# Patient Record
Sex: MALE | Race: WHITE | NOT HISPANIC OR LATINO | Employment: FULL TIME | URBAN - METROPOLITAN AREA
[De-identification: names, ages, dates, MRNs, and addresses within clinical notes are randomized per-mention and may not be internally consistent; named-entity substitution may affect disease eponyms.]

---

## 2018-07-16 ENCOUNTER — HOSPITAL ENCOUNTER (OUTPATIENT)
Dept: FAMILY MEDICINE CLINIC | Facility: CLINIC | Age: 33
Discharge: HOME | End: 2018-07-16
Attending: NURSE PRACTITIONER | Admitting: NURSE PRACTITIONER

## 2018-07-17 ENCOUNTER — CONVERSION ENCOUNTER (OUTPATIENT)
Dept: FAMILY MEDICINE CLINIC | Facility: CLINIC | Age: 33
End: 2018-07-17

## 2018-07-17 LAB
ALBUMIN SERPL-MCNC: 4.2 G/DL (ref 3.6–5.1)
ALBUMIN/GLOB SERPL: NORMAL {RATIO} (ref 1–2.1)
ALP SERPL-CCNC: 64 UNITS/L (ref 40–115)
ALT SERPL-CCNC: 21 UNITS/L (ref 8–46)
AST SERPL-CCNC: 20 UNITS/L (ref 10–40)
BASOPHILS NFR BLD AUTO: 1 %
BILIRUB SERPL-MCNC: 0.2 MG/DL (ref 0.2–1.2)
BUN SERPL-MCNC: 18 MG/DL (ref 7–25)
BUN/CREAT SERPL: NORMAL (ref 6–22)
CALCIUM SERPL-MCNC: 9.5 MG/DL (ref 8.6–10.2)
CHLORIDE SERPL-SCNC: 107 MMOL/L (ref 98–110)
CHOLEST SERPL-MCNC: 168 MG/DL (ref 125–200)
CHOLEST/HDLC SERPL: NORMAL {RATIO}
CONV CO2: 23 MMOL/L (ref 21–33)
CONV NEUTROPHILS/100 LEUKOCYTES IN BODY FLUID BY MANUAL COUNT: 63 %
CONV TOTAL PROTEIN: 6.6 G/DL (ref 6.2–8.3)
CREAT UR-MCNC: 0.9 MG/DL (ref 0.79–1.33)
EOSINOPHIL # BLD AUTO: 3 %
EOSINOPHIL # BLD AUTO: NORMAL 10*3/MM3 (ref 15–500)
ERYTHROCYTE [DISTWIDTH] IN BLOOD BY AUTOMATED COUNT: 13.9 % (ref 11–15)
GLOBULIN UR ELPH-MCNC: NORMAL G/DL (ref 2.1–3.7)
GLUCOSE SERPL-MCNC: 95 MG/DL (ref 65–99)
HCT VFR BLD AUTO: 48 % (ref 38.5–50)
HDLC SERPL-MCNC: 46 MG/DL
HGB BLD-MCNC: 15.5 G/DL (ref 13.2–17.1)
LDLC SERPL CALC-MCNC: 94 MG/DL
LYMPHOCYTES # BLD AUTO: NORMAL 10*3/MM3 (ref 850–3900)
LYMPHOCYTES NFR BLD AUTO: 27 %
MCH RBC QN AUTO: 29.3 PG (ref 27–33)
MCHC RBC AUTO-ENTMCNC: NORMAL % (ref 32–36)
MCV RBC AUTO: 90.6 FL (ref 80–100)
MONOCYTES # BLD AUTO: NORMAL 10*3/MICROLITER (ref 200–950)
MONOCYTES NFR BLD AUTO: 8 %
NEUTROPHILS # BLD AUTO: NORMAL 10*3/MM3 (ref 1500–7800)
PLATELET # BLD AUTO: NORMAL 10*3/MM3 (ref 140–400)
PMV BLD AUTO: 10.3 FL (ref 7.5–11.5)
POTASSIUM SERPL-SCNC: 4.1 MMOL/L (ref 3.5–5.3)
RBC # BLD AUTO: NORMAL 10*6/MM3 (ref 4.2–5.8)
SODIUM SERPL-SCNC: 138 MMOL/L (ref 135–146)
TRIGL SERPL-MCNC: 140 MG/DL
WBC # BLD AUTO: NORMAL 10*3/MM3 (ref 3.8–10.8)

## 2019-02-19 ENCOUNTER — HOSPITAL ENCOUNTER (OUTPATIENT)
Dept: FAMILY MEDICINE CLINIC | Facility: CLINIC | Age: 34
Discharge: HOME | End: 2019-02-19
Attending: NURSE PRACTITIONER | Admitting: NURSE PRACTITIONER

## 2019-04-15 ENCOUNTER — HOSPITAL ENCOUNTER (OUTPATIENT)
Dept: MRI IMAGING | Facility: HOSPITAL | Age: 34
Discharge: HOME | End: 2019-04-15
Attending: ORTHOPAEDIC SURGERY | Admitting: ORTHOPAEDIC SURGERY

## 2019-05-16 ENCOUNTER — HOSPITAL ENCOUNTER (OUTPATIENT)
Dept: PREADMISSION TESTING | Facility: HOSPITAL | Age: 34
Discharge: HOME | End: 2019-05-16
Attending: ORTHOPAEDIC SURGERY | Admitting: ORTHOPAEDIC SURGERY

## 2019-05-16 LAB
ANION GAP SERPL CALC-SCNC: 11.8 MMOL/L (ref 10–20)
BASOPHILS # BLD AUTO: 0 10*3/UL (ref 0–0.2)
BASOPHILS NFR BLD AUTO: 0 % (ref 0–2)
BUN SERPL-MCNC: 13 MG/DL (ref 8–20)
BUN/CREAT SERPL: 14.4 (ref 6.2–20.3)
CALCIUM SERPL-MCNC: 9.1 MG/DL (ref 8.9–10.3)
CHLORIDE SERPL-SCNC: 105 MMOL/L (ref 101–111)
CONV CO2: 25 MMOL/L (ref 22–32)
CREAT UR-MCNC: 0.9 MG/DL (ref 0.7–1.2)
DIFFERENTIAL METHOD BLD: (no result)
EOSINOPHIL # BLD AUTO: 0.2 10*3/UL (ref 0–0.3)
EOSINOPHIL # BLD AUTO: 3 % (ref 0–3)
ERYTHROCYTE [DISTWIDTH] IN BLOOD BY AUTOMATED COUNT: 14 % (ref 11.5–14.5)
GLUCOSE SERPL-MCNC: 70 MG/DL (ref 65–99)
HCT VFR BLD AUTO: 48.8 % (ref 40–54)
HGB BLD-MCNC: 15.8 G/DL (ref 14–18)
LYMPHOCYTES # BLD AUTO: 1.4 10*3/UL (ref 0.8–4.8)
LYMPHOCYTES NFR BLD AUTO: 26 % (ref 18–42)
MCH RBC QN AUTO: 30 PG (ref 26–32)
MCHC RBC AUTO-ENTMCNC: 32.3 G/DL (ref 32–36)
MCV RBC AUTO: 92.9 FL (ref 80–94)
MONOCYTES # BLD AUTO: 0.5 10*3/UL (ref 0.1–1.3)
MONOCYTES NFR BLD AUTO: 8 % (ref 2–11)
NEUTROPHILS # BLD AUTO: 3.4 10*3/UL (ref 2.3–8.6)
NEUTROPHILS NFR BLD AUTO: 63 % (ref 50–75)
NRBC BLD AUTO-RTO: 0 /100{WBCS}
NRBC/RBC NFR BLD MANUAL: 0 10*3/UL
PLATELET # BLD AUTO: 162 10*3/UL (ref 150–450)
PMV BLD AUTO: 11.1 FL (ref 7.4–10.4)
POTASSIUM SERPL-SCNC: 3.8 MMOL/L (ref 3.6–5.1)
RBC # BLD AUTO: 5.26 10*6/UL (ref 4.6–6)
SODIUM SERPL-SCNC: 138 MMOL/L (ref 136–144)
WBC # BLD AUTO: 5.4 10*3/UL (ref 4.5–11.5)

## 2019-11-20 ENCOUNTER — OFFICE VISIT (OUTPATIENT)
Dept: ORTHOPEDIC SURGERY | Facility: CLINIC | Age: 34
End: 2019-11-20

## 2019-11-20 VITALS
HEIGHT: 72 IN | WEIGHT: 181 LBS | SYSTOLIC BLOOD PRESSURE: 113 MMHG | DIASTOLIC BLOOD PRESSURE: 82 MMHG | HEART RATE: 93 BPM | BODY MASS INDEX: 24.52 KG/M2

## 2019-11-20 DIAGNOSIS — M25.512 ACUTE PAIN OF LEFT SHOULDER: Primary | ICD-10-CM

## 2019-11-20 PROBLEM — F17.200 TOBACCO DEPENDENCE SYNDROME: Status: ACTIVE | Noted: 2018-07-16

## 2019-11-20 PROBLEM — F43.10 POSTTRAUMATIC STRESS DISORDER: Status: ACTIVE | Noted: 2018-07-16

## 2019-11-20 PROBLEM — J45.909 ASTHMA: Status: ACTIVE | Noted: 2018-07-16

## 2019-11-20 PROBLEM — F41.8 ANXIETY ASSOCIATED WITH DEPRESSION: Status: ACTIVE | Noted: 2018-07-16

## 2019-11-20 PROBLEM — G47.00 INSOMNIA: Status: ACTIVE | Noted: 2018-07-16

## 2019-11-20 PROBLEM — M25.562 LEFT LATERAL KNEE PAIN: Status: ACTIVE | Noted: 2017-10-26

## 2019-11-20 PROBLEM — Z13.220 ENCOUNTER FOR LIPID SCREENING FOR CARDIOVASCULAR DISEASE: Status: ACTIVE | Noted: 2018-07-16

## 2019-11-20 PROBLEM — Z13.6 ENCOUNTER FOR LIPID SCREENING FOR CARDIOVASCULAR DISEASE: Status: ACTIVE | Noted: 2018-07-16

## 2019-11-20 PROBLEM — M54.50 LOW BACK PAIN: Status: ACTIVE | Noted: 2019-02-19

## 2019-11-20 PROCEDURE — 99213 OFFICE O/P EST LOW 20 MIN: CPT | Performed by: ORTHOPAEDIC SURGERY

## 2019-11-20 RX ORDER — ESCITALOPRAM OXALATE 10 MG/1
10 TABLET ORAL EVERY MORNING
COMMUNITY
Start: 2019-01-21 | End: 2021-03-29 | Stop reason: SDUPTHER

## 2019-11-20 RX ORDER — TRAZODONE HYDROCHLORIDE 50 MG/1
TABLET ORAL
COMMUNITY
Start: 2019-01-21 | End: 2021-03-29

## 2019-11-20 NOTE — PROGRESS NOTES
"     Patient ID: Obi Pereyra is a 34 y.o. male.  Left shoulder pain  Obi is a 34-year-old gentleman with return of left shoulder pain.  He did well with a cortisone injection last year but now has significant pain over the top and front of the shoulder worse with lifting and at night.  He is having some increasing popping as well pain is sharp and a 6/10    Review of Systems:  Left shoulder pain  Denies chest pain      Objective:    /82   Pulse 93   Ht 182.9 cm (72\")   Wt 82.1 kg (181 lb)   BMI 24.55 kg/m²     Physical Examination:     Well-developed, well-nourished, in no acute distress; alert and oriented x 3.       Shoulder Exam:     Left shoulder demonstrates no scars and no atrophy.  He has pain over the biceps groove and AC joint.  Passive elevation 170°, abduction 130°, external rotation 40°, internal rotation is the left hip.  He has pain and weakness on Speed and [place] testing.    Supraspinatus is mildly positive for pain.  Belly press and lift-off are 4/5 and 4/5.with pain on crossarm adduction.  sensory and motor exam are intact all distributions.  Radial pulse is palpable and capillary refill is less than two seconds to all digits    Imaging:   X-ray demonstrates well-maintained joint space of the distal clavicle ostial lysis    Assessment:    Left shoulder pain possible cuff tear    Plan:  Options discussed and I recommend MRI to evaluate for possible cuff tear          Disclaimer: Please note that areas of this note were completed with computer voice recognition software.  Quite often unanticipated grammatical, syntax, homophones, and other interpretive errors are inadvertently transcribed by the computer software. Please excuse any errors that have escaped final proofreading.  "

## 2019-12-01 ENCOUNTER — HOSPITAL ENCOUNTER (OUTPATIENT)
Dept: MRI IMAGING | Facility: HOSPITAL | Age: 34
Discharge: HOME | End: 2019-12-01
Admitting: ORTHOPAEDIC SURGERY

## 2019-12-01 DIAGNOSIS — M25.512 ACUTE PAIN OF LEFT SHOULDER: ICD-10-CM

## 2019-12-01 PROCEDURE — 73221 MRI JOINT UPR EXTREM W/O DYE: CPT

## 2019-12-11 ENCOUNTER — OFFICE VISIT (OUTPATIENT)
Dept: ORTHOPEDIC SURGERY | Facility: CLINIC | Age: 34
End: 2019-12-11

## 2019-12-11 VITALS
SYSTOLIC BLOOD PRESSURE: 143 MMHG | HEART RATE: 92 BPM | DIASTOLIC BLOOD PRESSURE: 82 MMHG | WEIGHT: 181 LBS | HEIGHT: 72 IN | BODY MASS INDEX: 24.52 KG/M2

## 2019-12-11 DIAGNOSIS — S43.432D SUPERIOR GLENOID LABRUM LESION OF LEFT SHOULDER, SUBSEQUENT ENCOUNTER: ICD-10-CM

## 2019-12-11 DIAGNOSIS — M25.512 ACUTE PAIN OF LEFT SHOULDER: Primary | ICD-10-CM

## 2019-12-11 PROCEDURE — 99213 OFFICE O/P EST LOW 20 MIN: CPT | Performed by: ORTHOPAEDIC SURGERY

## 2019-12-11 NOTE — PROGRESS NOTES
"     Patient ID: Obi Pereyra is a 34 y.o. male.  Left shoulder pain  Continued activity related shoulder pain    Review of Systems:  Left shoulder pain  Denies chest pain      Objective:    /82   Pulse 92   Ht 182.9 cm (72\")   Wt 82.1 kg (181 lb)   BMI 24.55 kg/m²     Physical Examination:     Well-developed, well-nourished, in no acute distress; alert and oriented x 3.       Shoulder Exam:     Left shoulder demonstrates no scars and no atrophy.  He has pain over the biceps groove and AC joint.  Passive elevation 170°, abduction 130°, external rotation 40°, internal rotation is the left hip.  He has pain and weakness on Speed and [place] testing.    Supraspinatus is mildly positive for pain.  Belly press and lift-off are 4/5 and 4/5.with pain on crossarm adduction.  sensory and motor exam are intact all distributions.  Radial pulse is palpable and capillary refill is less than two seconds to all digits    Imaging:   MRI demonstrates SLAP tear    Assessment:    Superior labrum tear left shoulder    Plan:  Options discussed, recommend therapy, see me in 6 weeks          Disclaimer: Please note that areas of this note were completed with computer voice recognition software.  Quite often unanticipated grammatical, syntax, homophones, and other interpretive errors are inadvertently transcribed by the computer software. Please excuse any errors that have escaped final proofreading.  "

## 2020-01-22 ENCOUNTER — OFFICE VISIT (OUTPATIENT)
Dept: ORTHOPEDIC SURGERY | Facility: CLINIC | Age: 35
End: 2020-01-22

## 2020-01-22 VITALS
SYSTOLIC BLOOD PRESSURE: 166 MMHG | BODY MASS INDEX: 24.52 KG/M2 | HEIGHT: 72 IN | DIASTOLIC BLOOD PRESSURE: 80 MMHG | WEIGHT: 181 LBS | HEART RATE: 91 BPM

## 2020-01-22 DIAGNOSIS — S43.432D SUPERIOR GLENOID LABRUM LESION OF LEFT SHOULDER, SUBSEQUENT ENCOUNTER: Primary | ICD-10-CM

## 2020-01-22 PROCEDURE — 99213 OFFICE O/P EST LOW 20 MIN: CPT | Performed by: ORTHOPAEDIC SURGERY

## 2020-01-22 NOTE — PROGRESS NOTES
Patient ID: Obi Pereyra is a 34 y.o. male.  Left shoulder pain  SLAP tear, treated with PT the last 6 weeks.  Feels like he is about 50% better, not at goal    Review of Systems:  Left shoulder pain  Denies chest pain      Objective:    There were no vitals taken for this visit.    Physical Examination:     Well-developed, well-nourished, in no acute distress; alert and oriented x 3.       Shoulder Exam:     Left shoulder demonstrates no scars and no atrophy.  He has pain over the biceps groove and AC joint.  Passive elevation 170°, abduction 130°, external rotation 40°, internal rotation is the left hip.  He has pain and weakness on Speed and [place] testing.    Supraspinatus is mildly positive for pain.  Belly press and lift-off are 4/5 and 4/5.with pain on crossarm adduction.  sensory and motor exam are intact all distributions.  Radial pulse is palpable and capillary refill is less than two seconds to all digits    Imaging:       Assessment:    Left shoulder SLAP tear moderately improved    Plan:  Further options were discussed and he would like to continue conservative management, finish out formal therapy then home exercise program and see me as needed          Disclaimer: Please note that areas of this note were completed with computer voice recognition software.  Quite often unanticipated grammatical, syntax, homophones, and other interpretive errors are inadvertently transcribed by the computer software. Please excuse any errors that have escaped final proofreading.

## 2021-03-25 ENCOUNTER — TELEPHONE (OUTPATIENT)
Dept: FAMILY MEDICINE CLINIC | Facility: CLINIC | Age: 36
End: 2021-03-25

## 2021-03-25 NOTE — TELEPHONE ENCOUNTER
Caller: Obi Pereyra    Relationship to patient: Self    Best call back number: [phone] (H)  New or established patient?  [x] New  [] Established    Date of discharge: 3/22/21    Facility discharged from:[facility]

## 2021-03-25 NOTE — TELEPHONE ENCOUNTER
Caller: MYLA    Relationship: Other    Best call back number:[phone] EXT. [phone]    Do you know the name of the person who called: ,CRYSTAL [employer]    What was the call regarding: CRYSTAL FROM [employer] CALLED TO ADVISE CLEMENTE BRONSON THAT PATIENT WAS ADMITTED TO [facility] FOR DEPRESSION 3/20/21-3/22/21. STATED THAT IF YOU NEEDED ANY FURTHER INFORMATION ABOUT ADMISSION TO CONTACT [facility].

## 2021-03-29 ENCOUNTER — OFFICE VISIT (OUTPATIENT)
Dept: FAMILY MEDICINE CLINIC | Facility: CLINIC | Age: 36
End: 2021-03-29

## 2021-03-29 VITALS
HEART RATE: 79 BPM | HEIGHT: 72 IN | DIASTOLIC BLOOD PRESSURE: 74 MMHG | TEMPERATURE: 98.9 F | SYSTOLIC BLOOD PRESSURE: 137 MMHG | OXYGEN SATURATION: 99 % | WEIGHT: 211 LBS | BODY MASS INDEX: 28.58 KG/M2

## 2021-03-29 DIAGNOSIS — G47.00 INSOMNIA, UNSPECIFIED TYPE: ICD-10-CM

## 2021-03-29 DIAGNOSIS — Z00.00 PREVENTATIVE HEALTH CARE: Primary | ICD-10-CM

## 2021-03-29 DIAGNOSIS — F41.8 ANXIETY ASSOCIATED WITH DEPRESSION: ICD-10-CM

## 2021-03-29 DIAGNOSIS — F17.200 TOBACCO DEPENDENCE SYNDROME: ICD-10-CM

## 2021-03-29 PROCEDURE — 99214 OFFICE O/P EST MOD 30 MIN: CPT | Performed by: NURSE PRACTITIONER

## 2021-03-29 RX ORDER — LISINOPRIL 10 MG/1
10 TABLET ORAL DAILY
COMMUNITY
Start: 2021-03-22 | End: 2021-03-29 | Stop reason: SDUPTHER

## 2021-03-29 RX ORDER — ESCITALOPRAM OXALATE 10 MG/1
10 TABLET ORAL EVERY MORNING
Qty: 90 TABLET | Refills: 1 | Status: SHIPPED | OUTPATIENT
Start: 2021-03-29

## 2021-03-29 RX ORDER — LAMOTRIGINE 25 MG/1
1 TABLET ORAL 2 TIMES DAILY
COMMUNITY
Start: 2021-03-22 | End: 2021-03-29 | Stop reason: SDUPTHER

## 2021-03-29 RX ORDER — TRAZODONE HYDROCHLORIDE 50 MG/1
TABLET ORAL
Qty: 60 TABLET | Refills: 2 | Status: SHIPPED | OUTPATIENT
Start: 2021-03-29

## 2021-03-29 RX ORDER — ALBUTEROL SULFATE 90 UG/1
2 AEROSOL, METERED RESPIRATORY (INHALATION) EVERY 4 HOURS PRN
Qty: 18 G | Refills: 2 | Status: SHIPPED | OUTPATIENT
Start: 2021-03-29

## 2021-03-29 RX ORDER — LISINOPRIL 10 MG/1
10 TABLET ORAL DAILY
Qty: 90 TABLET | Refills: 14 | Status: SHIPPED | OUTPATIENT
Start: 2021-03-29

## 2021-03-29 RX ORDER — LAMOTRIGINE 25 MG/1
25 TABLET ORAL 2 TIMES DAILY
Qty: 90 TABLET | Refills: 1 | Status: SHIPPED | OUTPATIENT
Start: 2021-03-29

## 2021-03-29 NOTE — PROGRESS NOTES
"    Obi Pereyra is a 35 y.o. male.     35-year-old white male  smoker/tobacco chewer with history of asthma, PTSD, insomnia, depression anxiety who comes in today follow-up admission at [facility] in [facility] for  alcohol intoxication and was put on a 72-hour hold.  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  He was placed on Lamictal and Lexapro at the facility however I do not have a note to know the reasoning or what his diagnosis was.  He states he still not sleeping only 1 or 2 hours a night I am placing him on trazodone to try.  Patient is attending AA meetings 3 times a week.  He denies any homicidal or suicidal ideation  Patient was also placed on lisinopril for his blood pressure it is 136/74 with a heart rate of 78 today and he does have a small murmur.  He denies any chest pain,  tachycardia or dizziness.  He is having some dyspnea with exertion due to smoking for some many years and I ordered him a Ventolin inhaler to try   Weight is 211 with a BMI of 28.6    Trazodone 50 mg 1/2-2 tabs nightly  Refill all medications  Monitor blood pressure at home  Keep all appointments for AA meetings  Cut back on caffeine products  Follow-up 1 month           The following portions of the patient's history were reviewed and updated as appropriate: allergies, current medications, past family history, past medical history, past social history, past surgical history and problem list.    Vitals:    03/29/21 0927   BP: 137/74   BP Location: Right arm   Patient Position: Sitting   Cuff Size: Large Adult   Pulse: 79   Temp: 98.9 °F (37.2 °C)   TempSrc: Temporal   SpO2: 99%   Weight: 95.7 kg (211 lb)   Height: 182.9 cm (72\")     Body mass index is 28.62 kg/m².    Past Medical History:   Diagnosis Date   • Anxiety    • Depression    • PTSD (post-traumatic stress disorder)      Past Surgical History:   Procedure Laterality Date   • ELBOW " PROCEDURE Right 2019     ulnar nerve   • SHOULDER SURGERY Right      Family History   Problem Relation Age of Onset   • No Known Problems Mother    • No Known Problems Father        There is no immunization history on file for this patient.    Conversion Encounter on 07/17/2018   Component Date Value Ref Range Status   • Albumin 07/17/2018 4.2  3.6 - 5.1 g/dL Final   • Alkaline Phosphatase 07/17/2018 64  40 - 115 units/L Final   • Basophil Rel % 07/17/2018 1  % Final   • Glucose 07/17/2018 95  65 - 99 mg/dL Final   • Total Bilirubin 07/17/2018 0.2  0.2 - 1.2 mg/dL Final   • BUN 07/17/2018 18  7 - 25 mg/dL Final   • BUN/Creatinine Ratio 07/17/2018 20.0 (calc)  6 - 22 Final   • Calcium 07/17/2018 9.5  8.6 - 10.2 mg/dL Final   • Chloride 07/17/2018 107  98 - 110 mmol/L Final   • Chol/HDL Ratio 07/17/2018 3.6 (calc)  <5.1 Final   • Total Cholesterol 07/17/2018 168  125 - 200 mg/dL Final   • CO2 07/17/2018 23  21 - 33 mmol/L Final   • Creatinine 07/17/2018 0.9  0.79 - 1.33 mg/dL Final   • eGFR  Am 07/17/2018 >60 mL/min/1.73m2  >59 mL/min/1.73m2 Final   • eGFR Non  Am 07/17/2018 >60 mL/min/1.73m2  >59 mL/min/1.73m2 Final   • Eosinophils Absolute 07/17/2018 200 CELLS/UL  15 - 500 10*3/mm3 Final   • Eosinophil Rel % 07/17/2018 3  % Final   • Globulin 07/17/2018 2.4 G/DL (CALC)  2.1 - 3.7 g/dL Final   • Hematocrit 07/17/2018 48.0  38.5 - 50.0 % Final   • HDL Cholesterol 07/17/2018 46  >40 mg/dL Final   • Hemoglobin 07/17/2018 15.5  13.2 - 17.1 g/dL Final   • LDL Cholesterol  07/17/2018 94  <130 mg/dL Final   • Lymphocytes Absolute 07/17/2018 1500 CELLS/UL  850 - 3900 10*3/mm3 Final   • Lymphocyte Rel % 07/17/2018 27  % Final   • MCH 07/17/2018 29.3  27.0 - 33.0 pg Final   • MCHC 07/17/2018 32.3 G/DL  32.0 - 36.0 % Final   • MCV 07/17/2018 90.6  80.0 - 100.0 fL Final   • Monocyte Rel % 07/17/2018 8  % Final   • Monocytes Absolute 07/17/2018 400 CELLS/UL  200 - 950 10*3/microliter Final   • MPV 07/17/2018 10.3   7.5 - 11.5 fL Final   • Neutrophils Absolute 07/17/2018 3400 CELLS/UL  1500 - 7800 10*3/mm3 Final   • Platelets 07/17/2018 216 THOUSAND/UL  140 - 400 10*3/mm3 Final   • Neutrophils, Fluid 07/17/2018 63  % Final   • Potassium 07/17/2018 4.1  3.5 - 5.3 mmol/L Final   • Total Protein 07/17/2018 6.6  6.2 - 8.3 g/dL Final   • RBC 07/17/2018 5.29 MILLION/UL  4.20 - 5.80 10*6/mm3 Final   • RDW 07/17/2018 13.9  11.0 - 15.0 % Final   • AST (SGOT) 07/17/2018 20  10 - 40 units/L Final   • ALT (SGPT) 07/17/2018 21  8 - 46 units/L Final   • Sodium 07/17/2018 138  135 - 146 mmol/L Final   • Triglycerides 07/17/2018 140  <150 mg/dL Final   • WBC 07/17/2018 5.5 THOUSAND/UL  3.8 - 10.8 10*3/mm3 Final   • A/G Ratio 07/17/2018 1.8 (calc)  1.0 - 2.1 Final         Review of Systems   Constitutional: Negative.    HENT: Negative.    Respiratory: Negative.    Cardiovascular: Negative.    Genitourinary: Negative.    Musculoskeletal: Negative.    Skin: Negative.    Neurological: Negative.    Psychiatric/Behavioral: Positive for sleep disturbance.       Objective   Physical Exam  Constitutional:       Appearance: Normal appearance.   HENT:      Head: Normocephalic.   Cardiovascular:      Rate and Rhythm: Normal rate.      Pulses: Normal pulses.      Heart sounds: Murmur heard.     Pulmonary:      Breath sounds: Normal breath sounds.   Abdominal:      General: Bowel sounds are normal.   Musculoskeletal:         General: Normal range of motion.      Cervical back: Normal range of motion.   Skin:     General: Skin is warm and dry.   Neurological:      General: No focal deficit present.      Mental Status: He is alert and oriented to person, place, and time.   Psychiatric:         Mood and Affect: Mood normal.         Behavior: Behavior normal.         Procedures    Assessment/Plan   Diagnoses and all orders for this visit:    1. Preventative health care (Primary)  -     CBC & Differential  -     Comprehensive Metabolic Panel  -     Lipid Panel  With LDL / HDL Ratio    2. Anxiety associated with depression    3. Insomnia, unspecified type    4. Tobacco dependence syndrome    5. BMI 28.0-28.9,adult    Other orders  -     traZODone (DESYREL) 50 MG tablet; 1/2 - 2 tabs 30 minutes before bedtime  Dispense: 60 tablet; Refill: 2  -     lisinopril (PRINIVIL,ZESTRIL) 10 MG tablet; Take 1 tablet by mouth Daily.  Dispense: 90 tablet; Refill: 14  -     lamoTRIgine (LaMICtal) 25 MG tablet; Take 1 tablet by mouth 2 (two) times a day.  Dispense: 90 tablet; Refill: 1  -     escitalopram (Lexapro) 10 MG tablet; Take 1 tablet by mouth Every Morning.  Dispense: 90 tablet; Refill: 1  -     albuterol sulfate HFA (Ventolin HFA) 108 (90 Base) MCG/ACT inhaler; Inhale 2 puffs Every 4 (Four) Hours As Needed for Wheezing.  Dispense: 18 g; Refill: 2          Current Outpatient Medications:   •  escitalopram (Lexapro) 10 MG tablet, Take 1 tablet by mouth Every Morning., Disp: 90 tablet, Rfl: 1  •  lamoTRIgine (LaMICtal) 25 MG tablet, Take 1 tablet by mouth 2 (two) times a day., Disp: 90 tablet, Rfl: 1  •  lisinopril (PRINIVIL,ZESTRIL) 10 MG tablet, Take 1 tablet by mouth Daily., Disp: 90 tablet, Rfl: 14  •  albuterol sulfate HFA (Ventolin HFA) 108 (90 Base) MCG/ACT inhaler, Inhale 2 puffs Every 4 (Four) Hours As Needed for Wheezing., Disp: 18 g, Rfl: 2  •  traZODone (DESYREL) 50 MG tablet, 1/2 - 2 tabs 30 minutes before bedtime, Disp: 60 tablet, Rfl: 2

## 2021-03-29 NOTE — PATIENT INSTRUCTIONS
Take trazodone as directed for insomnia  Monitor blood pressures at home  Keep all appointments with AA  Cut back on caffeine products  Follow-up 1 month  Stop smoking , consider smoking cessation

## 2021-03-30 LAB
ALBUMIN SERPL-MCNC: 4.8 G/DL (ref 4–5)
ALBUMIN/GLOB SERPL: 2 {RATIO} (ref 1.2–2.2)
ALP SERPL-CCNC: 79 IU/L (ref 39–117)
ALT SERPL-CCNC: 21 IU/L (ref 0–44)
AST SERPL-CCNC: 21 IU/L (ref 0–40)
BASOPHILS # BLD AUTO: 0 X10E3/UL (ref 0–0.2)
BASOPHILS NFR BLD AUTO: 1 %
BILIRUB SERPL-MCNC: 0.5 MG/DL (ref 0–1.2)
BUN SERPL-MCNC: 9 MG/DL (ref 6–20)
BUN/CREAT SERPL: 9 (ref 9–20)
CALCIUM SERPL-MCNC: 10.2 MG/DL (ref 8.7–10.2)
CHLORIDE SERPL-SCNC: 103 MMOL/L (ref 96–106)
CHOLEST SERPL-MCNC: 162 MG/DL (ref 100–199)
CO2 SERPL-SCNC: 23 MMOL/L (ref 20–29)
CREAT SERPL-MCNC: 0.99 MG/DL (ref 0.76–1.27)
EOSINOPHIL # BLD AUTO: 0.1 X10E3/UL (ref 0–0.4)
EOSINOPHIL NFR BLD AUTO: 1 %
ERYTHROCYTE [DISTWIDTH] IN BLOOD BY AUTOMATED COUNT: 12.4 % (ref 11.6–15.4)
GLOBULIN SER CALC-MCNC: 2.4 G/DL (ref 1.5–4.5)
GLUCOSE SERPL-MCNC: 97 MG/DL (ref 65–99)
HCT VFR BLD AUTO: 50.1 % (ref 37.5–51)
HDLC SERPL-MCNC: 49 MG/DL
HGB BLD-MCNC: 17.3 G/DL (ref 13–17.7)
IMM GRANULOCYTES # BLD AUTO: 0 X10E3/UL (ref 0–0.1)
IMM GRANULOCYTES NFR BLD AUTO: 0 %
LDLC SERPL CALC-MCNC: 94 MG/DL (ref 0–99)
LDLC/HDLC SERPL: 1.9 RATIO (ref 0–3.6)
LYMPHOCYTES # BLD AUTO: 1.3 X10E3/UL (ref 0.7–3.1)
LYMPHOCYTES NFR BLD AUTO: 18 %
MCH RBC QN AUTO: 31.7 PG (ref 26.6–33)
MCHC RBC AUTO-ENTMCNC: 34.5 G/DL (ref 31.5–35.7)
MCV RBC AUTO: 92 FL (ref 79–97)
MONOCYTES # BLD AUTO: 0.7 X10E3/UL (ref 0.1–0.9)
MONOCYTES NFR BLD AUTO: 11 %
NEUTROPHILS # BLD AUTO: 4.9 X10E3/UL (ref 1.4–7)
NEUTROPHILS NFR BLD AUTO: 69 %
PLATELET # BLD AUTO: 211 X10E3/UL (ref 150–450)
POTASSIUM SERPL-SCNC: 4.1 MMOL/L (ref 3.5–5.2)
PROT SERPL-MCNC: 7.2 G/DL (ref 6–8.5)
RBC # BLD AUTO: 5.45 X10E6/UL (ref 4.14–5.8)
SODIUM SERPL-SCNC: 139 MMOL/L (ref 134–144)
TRIGL SERPL-MCNC: 107 MG/DL (ref 0–149)
VLDLC SERPL CALC-MCNC: 19 MG/DL (ref 5–40)
WBC # BLD AUTO: 7 X10E3/UL (ref 3.4–10.8)